# Patient Record
Sex: FEMALE | Race: BLACK OR AFRICAN AMERICAN | NOT HISPANIC OR LATINO | URBAN - METROPOLITAN AREA
[De-identification: names, ages, dates, MRNs, and addresses within clinical notes are randomized per-mention and may not be internally consistent; named-entity substitution may affect disease eponyms.]

---

## 2019-01-30 ENCOUNTER — EMERGENCY (EMERGENCY)
Facility: HOSPITAL | Age: 79
LOS: 1 days | Discharge: HOME | End: 2019-01-30
Attending: EMERGENCY MEDICINE | Admitting: EMERGENCY MEDICINE
Payer: COMMERCIAL

## 2019-01-30 VITALS
WEIGHT: 141.1 LBS | HEART RATE: 68 BPM | OXYGEN SATURATION: 97 % | DIASTOLIC BLOOD PRESSURE: 97 MMHG | SYSTOLIC BLOOD PRESSURE: 182 MMHG | RESPIRATION RATE: 17 BRPM | TEMPERATURE: 98 F

## 2019-01-30 PROCEDURE — 99283 EMERGENCY DEPT VISIT LOW MDM: CPT | Mod: 25

## 2019-01-30 PROCEDURE — 10060 I&D ABSCESS SIMPLE/SINGLE: CPT | Mod: 26

## 2019-01-30 PROCEDURE — 10060 I&D ABSCESS SIMPLE/SINGLE: CPT

## 2019-01-30 RX ORDER — CEPHALEXIN 500 MG
1 CAPSULE ORAL
Qty: 21 | Refills: 0 | OUTPATIENT
Start: 2019-01-30 | End: 2019-02-05

## 2019-01-30 RX ORDER — AZTREONAM 2 G
1 VIAL (EA) INJECTION
Qty: 20 | Refills: 0 | OUTPATIENT
Start: 2019-01-30 | End: 2019-02-08

## 2019-01-30 NOTE — ED ADULT NURSE REASSESSMENT NOTE - NS ED NURSE REASSESS COMMENT FT1
Patient evaluated by GYne and ED MD, vital signs stable, no new symptom complaint, discharged to [facility] in stable condition.

## 2019-01-30 NOTE — ED PROVIDER NOTE - OBJECTIVE STATEMENT
78 year old female presents to ED with concern for abscess formation to left perineum, progressive over the past 2 days.  Patient notes the area is very tender for her.  She denies history of similar symptoms in the past, fever, chills, chest pain, shortness of breath, abdominal pain, vaginal discharge or bleeding or any additional acute complaints or concerns at this time.

## 2019-01-30 NOTE — ED PROVIDER NOTE - MEDICAL DECISION MAKING DETAILS
pt in ED w concern for abscess to left perineum.  + Fluctuance.  No rectal involvement.  I&D/aspiration performed using 18 gauge needle and several cc's of purulent material drainaed.  Small amount of remaining induration to area.  Will prescribe keflex, bactrim and advise prompt PCP follow up as she already as planned for tomorrow with her physician.  Patient is advised to return to ED immediately should her symptoms worsen or if she has any concern prior to this recommended follow up.  Patient is aware of plan and verbalizes her understanding.  Will discharge home at this time.

## 2019-01-30 NOTE — ED ADULT NURSE NOTE - NSIMPLEMENTINTERV_GEN_ALL_ED
Implemented All Universal Safety Interventions:  [place] to call system. Call bell, personal items and telephone within reach. Instruct patient to call for assistance. Room bathroom lighting operational. Non-slip footwear when patient is off stretcher. Physically safe environment: no spills, clutter or unnecessary equipment. Stretcher in lowest position, wheels locked, appropriate side rails in place.

## 2019-01-30 NOTE — ED ADULT NURSE NOTE - OBJECTIVE STATEMENT
Patient c/o of pain on rectal area X 2 days, states may have an abscess in area.  No discharge or bleeding noted, no fevers.

## 2019-01-30 NOTE — ED PROVIDER NOTE - GENITOURINARY, MLM
+ Fluctuant abscess to left perineum measuring apx 2 cm x 2 cm.  Small amount of overlying erythema.

## 2019-02-03 DIAGNOSIS — L02.31 CUTANEOUS ABSCESS OF BUTTOCK: ICD-10-CM

## 2019-02-03 DIAGNOSIS — L02.215 CUTANEOUS ABSCESS OF PERINEUM: ICD-10-CM

## 2019-02-03 DIAGNOSIS — Z88.0 ALLERGY STATUS TO PENICILLIN: ICD-10-CM

## 2019-02-03 DIAGNOSIS — Z88.5 ALLERGY STATUS TO NARCOTIC AGENT: ICD-10-CM

## 2022-02-18 ENCOUNTER — EMERGENCY (EMERGENCY)
Facility: HOSPITAL | Age: 82
LOS: 1 days | Discharge: HOME | End: 2022-02-18
Attending: EMERGENCY MEDICINE | Admitting: EMERGENCY MEDICINE
Payer: COMMERCIAL

## 2022-02-18 VITALS
RESPIRATION RATE: 16 BRPM | WEIGHT: 149.91 LBS | TEMPERATURE: 98 F | DIASTOLIC BLOOD PRESSURE: 107 MMHG | OXYGEN SATURATION: 96 % | SYSTOLIC BLOOD PRESSURE: 194 MMHG | HEART RATE: 60 BPM

## 2022-02-18 VITALS
HEART RATE: 62 BPM | TEMPERATURE: 98 F | RESPIRATION RATE: 17 BRPM | OXYGEN SATURATION: 99 % | DIASTOLIC BLOOD PRESSURE: 82 MMHG | SYSTOLIC BLOOD PRESSURE: 148 MMHG

## 2022-02-18 DIAGNOSIS — M79.604 PAIN IN RIGHT LEG: ICD-10-CM

## 2022-02-18 DIAGNOSIS — M19.90 UNSPECIFIED OSTEOARTHRITIS, UNSPECIFIED SITE: ICD-10-CM

## 2022-02-18 DIAGNOSIS — G62.9 POLYNEUROPATHY, UNSPECIFIED: ICD-10-CM

## 2022-02-18 DIAGNOSIS — Z88.5 ALLERGY STATUS TO NARCOTIC AGENT: ICD-10-CM

## 2022-02-18 DIAGNOSIS — E11.42 TYPE 2 DIABETES MELLITUS WITH DIABETIC POLYNEUROPATHY: ICD-10-CM

## 2022-02-18 DIAGNOSIS — I10 ESSENTIAL (PRIMARY) HYPERTENSION: ICD-10-CM

## 2022-02-18 DIAGNOSIS — Z88.0 ALLERGY STATUS TO PENICILLIN: ICD-10-CM

## 2022-02-18 DIAGNOSIS — M54.12 RADICULOPATHY, CERVICAL REGION: ICD-10-CM

## 2022-02-18 LAB
ALBUMIN SERPL ELPH-MCNC: 4.3 G/DL — SIGNIFICANT CHANGE UP (ref 3.3–5)
ALP SERPL-CCNC: 70 U/L — SIGNIFICANT CHANGE UP (ref 40–120)
ALT FLD-CCNC: 12 U/L — SIGNIFICANT CHANGE UP (ref 10–45)
ANION GAP SERPL CALC-SCNC: 12 MMOL/L — SIGNIFICANT CHANGE UP (ref 5–17)
APPEARANCE UR: CLEAR — SIGNIFICANT CHANGE UP
AST SERPL-CCNC: 19 U/L — SIGNIFICANT CHANGE UP (ref 10–40)
BACTERIA # UR AUTO: SIGNIFICANT CHANGE UP /HPF
BASOPHILS # BLD AUTO: 0.07 K/UL — SIGNIFICANT CHANGE UP (ref 0–0.2)
BASOPHILS NFR BLD AUTO: 1.1 % — SIGNIFICANT CHANGE UP (ref 0–2)
BILIRUB SERPL-MCNC: 0.4 MG/DL — SIGNIFICANT CHANGE UP (ref 0.2–1.2)
BILIRUB UR-MCNC: NEGATIVE — SIGNIFICANT CHANGE UP
BUN SERPL-MCNC: 24 MG/DL — HIGH (ref 7–23)
CALCIUM SERPL-MCNC: 10.7 MG/DL — HIGH (ref 8.4–10.5)
CHLORIDE SERPL-SCNC: 94 MMOL/L — LOW (ref 96–108)
CO2 SERPL-SCNC: 31 MMOL/L — SIGNIFICANT CHANGE UP (ref 22–31)
COLOR SPEC: YELLOW — SIGNIFICANT CHANGE UP
CREAT SERPL-MCNC: 0.92 MG/DL — SIGNIFICANT CHANGE UP (ref 0.5–1.3)
DIFF PNL FLD: NEGATIVE — SIGNIFICANT CHANGE UP
EOSINOPHIL # BLD AUTO: 0.12 K/UL — SIGNIFICANT CHANGE UP (ref 0–0.5)
EOSINOPHIL NFR BLD AUTO: 1.9 % — SIGNIFICANT CHANGE UP (ref 0–6)
EPI CELLS # UR: SIGNIFICANT CHANGE UP /HPF (ref 0–5)
GLUCOSE SERPL-MCNC: 97 MG/DL — SIGNIFICANT CHANGE UP (ref 70–99)
GLUCOSE UR QL: NEGATIVE — SIGNIFICANT CHANGE UP
HCT VFR BLD CALC: 40.8 % — SIGNIFICANT CHANGE UP (ref 34.5–45)
HGB BLD-MCNC: 13.1 G/DL — SIGNIFICANT CHANGE UP (ref 11.5–15.5)
IMM GRANULOCYTES NFR BLD AUTO: 0.3 % — SIGNIFICANT CHANGE UP (ref 0–1.5)
KETONES UR-MCNC: NEGATIVE — SIGNIFICANT CHANGE UP
LEUKOCYTE ESTERASE UR-ACNC: NEGATIVE — SIGNIFICANT CHANGE UP
LYMPHOCYTES # BLD AUTO: 1.65 K/UL — SIGNIFICANT CHANGE UP (ref 1–3.3)
LYMPHOCYTES # BLD AUTO: 25.8 % — SIGNIFICANT CHANGE UP (ref 13–44)
MAGNESIUM SERPL-MCNC: 1.7 MG/DL — SIGNIFICANT CHANGE UP (ref 1.6–2.6)
MCHC RBC-ENTMCNC: 27 PG — SIGNIFICANT CHANGE UP (ref 27–34)
MCHC RBC-ENTMCNC: 32.1 GM/DL — SIGNIFICANT CHANGE UP (ref 32–36)
MCV RBC AUTO: 84 FL — SIGNIFICANT CHANGE UP (ref 80–100)
MONOCYTES # BLD AUTO: 0.65 K/UL — SIGNIFICANT CHANGE UP (ref 0–0.9)
MONOCYTES NFR BLD AUTO: 10.2 % — SIGNIFICANT CHANGE UP (ref 2–14)
NEUTROPHILS # BLD AUTO: 3.89 K/UL — SIGNIFICANT CHANGE UP (ref 1.8–7.4)
NEUTROPHILS NFR BLD AUTO: 60.7 % — SIGNIFICANT CHANGE UP (ref 43–77)
NITRITE UR-MCNC: NEGATIVE — SIGNIFICANT CHANGE UP
NRBC # BLD: 0 /100 WBCS — SIGNIFICANT CHANGE UP (ref 0–0)
PH UR: 6.5 — SIGNIFICANT CHANGE UP (ref 5–8)
PLATELET # BLD AUTO: 337 K/UL — SIGNIFICANT CHANGE UP (ref 150–400)
POTASSIUM SERPL-MCNC: 3 MMOL/L — LOW (ref 3.5–5.3)
POTASSIUM SERPL-SCNC: 3 MMOL/L — LOW (ref 3.5–5.3)
PROT SERPL-MCNC: 7.4 G/DL — SIGNIFICANT CHANGE UP (ref 6–8.3)
PROT UR-MCNC: ABNORMAL MG/DL
RBC # BLD: 4.86 M/UL — SIGNIFICANT CHANGE UP (ref 3.8–5.2)
RBC # FLD: 14.5 % — SIGNIFICANT CHANGE UP (ref 10.3–14.5)
RBC CASTS # UR COMP ASSIST: < 5 /HPF — SIGNIFICANT CHANGE UP
SODIUM SERPL-SCNC: 137 MMOL/L — SIGNIFICANT CHANGE UP (ref 135–145)
SP GR SPEC: 1.01 — SIGNIFICANT CHANGE UP (ref 1–1.03)
UROBILINOGEN FLD QL: 0.2 E.U./DL — SIGNIFICANT CHANGE UP
WBC # BLD: 6.4 K/UL — SIGNIFICANT CHANGE UP (ref 3.8–10.5)
WBC # FLD AUTO: 6.4 K/UL — SIGNIFICANT CHANGE UP (ref 3.8–10.5)
WBC UR QL: < 5 /HPF — SIGNIFICANT CHANGE UP

## 2022-02-18 PROCEDURE — 72125 CT NECK SPINE W/O DYE: CPT | Mod: MG

## 2022-02-18 PROCEDURE — 36415 COLL VENOUS BLD VENIPUNCTURE: CPT

## 2022-02-18 PROCEDURE — G1004: CPT

## 2022-02-18 PROCEDURE — 93971 EXTREMITY STUDY: CPT

## 2022-02-18 PROCEDURE — 83735 ASSAY OF MAGNESIUM: CPT

## 2022-02-18 PROCEDURE — 93971 EXTREMITY STUDY: CPT | Mod: 26,RT

## 2022-02-18 PROCEDURE — 72125 CT NECK SPINE W/O DYE: CPT | Mod: 26,MG

## 2022-02-18 PROCEDURE — 70450 CT HEAD/BRAIN W/O DYE: CPT | Mod: MG

## 2022-02-18 PROCEDURE — 81001 URINALYSIS AUTO W/SCOPE: CPT

## 2022-02-18 PROCEDURE — 99284 EMERGENCY DEPT VISIT MOD MDM: CPT | Mod: 25

## 2022-02-18 PROCEDURE — 80053 COMPREHEN METABOLIC PANEL: CPT

## 2022-02-18 PROCEDURE — 85025 COMPLETE CBC W/AUTO DIFF WBC: CPT

## 2022-02-18 PROCEDURE — 70450 CT HEAD/BRAIN W/O DYE: CPT | Mod: 26,MG

## 2022-02-18 PROCEDURE — 99285 EMERGENCY DEPT VISIT HI MDM: CPT

## 2022-02-18 RX ORDER — METHOCARBAMOL 500 MG/1
750 TABLET, FILM COATED ORAL ONCE
Refills: 0 | Status: COMPLETED | OUTPATIENT
Start: 2022-02-18 | End: 2022-02-18

## 2022-02-18 RX ORDER — POTASSIUM CHLORIDE 20 MEQ
40 PACKET (EA) ORAL ONCE
Refills: 0 | Status: DISCONTINUED | OUTPATIENT
Start: 2022-02-18 | End: 2022-02-18

## 2022-02-18 RX ORDER — METHOCARBAMOL 500 MG/1
1 TABLET, FILM COATED ORAL
Qty: 30 | Refills: 0
Start: 2022-02-18 | End: 2022-03-19

## 2022-02-18 RX ORDER — POTASSIUM CHLORIDE 20 MEQ
40 PACKET (EA) ORAL ONCE
Refills: 0 | Status: COMPLETED | OUTPATIENT
Start: 2022-02-18 | End: 2022-02-18

## 2022-02-18 RX ADMIN — METHOCARBAMOL 750 MILLIGRAM(S): 500 TABLET, FILM COATED ORAL at 21:29

## 2022-02-18 RX ADMIN — Medication 40 MILLIEQUIVALENT(S): at 21:29

## 2022-02-18 NOTE — ED PROVIDER NOTE - OBJECTIVE STATEMENT
81 yo female with h/o HTN, DM, arthritis, neuropathy in the ER c/o worsening of chronic right leg pain, right arm and shoulder region pain, c/o frequent numbness to her right leg and arm. pt state her doctor prescribed her Gabapentin and recommended to take double dose if pain and numbness worse but its not helping. Pt denies any fall or injury, denies HA, dizziness, vision changes, denies CP, abdominal pain or back pain. Ambulatory with cane at baseline because of her leg arthritis.

## 2022-02-18 NOTE — ED ADULT NURSE NOTE - OBJECTIVE STATEMENT
Pt is an 83 y/o F, Hx HTN, neuropathy. pt reports right leg pain, right arm numbness x 1 month, "the numbness is worse when I try to sleep on that side."  states she is compliant with meds, took  today .denies headache, dizziness, n/v, weakness, slurred speech, facial droop or other complaints.

## 2022-02-18 NOTE — ED PROVIDER NOTE - NSFOLLOWUPINSTRUCTIONS_ED_ALL_ED_FT
CERVICAL RADICULOPATHY - General Information           Cervical Radiculopathy    WHAT YOU NEED TO KNOW:    What is cervical radiculopathy? Cervical radiculopathy is a painful condition that happens when a spinal nerve in your neck is pinched or irritated.     Vertebral Column         What causes cervical radiculopathy? Changes in the vertebrae (bones) and discs in your neck can put pressure on a spinal nerve. Discs are natural, spongy cushions between your vertebrae that allow your spine to move. The following can cause a pinched nerve:  •Disc damage may occur if a disc flattens, bulges, or moves over time. An injury can also cause disc damage.      •Cervical spondylosis is when the vertebrae in your neck break down. This normally occurs as you age.       •Growths, such as tumors or cysts (fluid-filled lumps), may grow and press on the nerve.      What are the signs and symptoms of cervical radiculopathy? The most common symptom is sharp pain that travels from your neck all the way down your arm. You may have pain in your shoulder, chest, and hand. The pain may get worse with movement or when you cough or sneeze. You may also have any of the following:   •Burning or tingling sensations in your neck or arm       •Numbness or weakness in your arm or hand that makes it hard for you to  objects      •Headaches       How is cervical radiculopathy diagnosed? Your healthcare provider will ask when and how your symptoms began. He will gently press on your neck to check for tenderness and areas that are not shaped correctly. He will also check your arms and hands for numbness or weakness. You may have any of the following:   •Provocative tests are done to check your response to certain movements. He will ask you to move your neck, shoulder, and arm in different ways. Some movements will increase your symptoms, while others will make you feel better.      •An x-ray is a picture of the bones and tissues in your neck.      •An MRI or a CT scan may be used to take pictures of your neck. The pictures can show problems and changes in your nerves, discs, and vertebrae. You may be given dye to help the pictures show up better. Tell the healthcare provider if you have ever had an allergic reaction to contrast dye. Do not enter the MRI room with anything metal. Metal can cause serious injury. Tell the healthcare provider if you have any metal in or on your body.      •An electromyography is also called an EMG. An EMG is done to test the function of your muscles and the nerves that control them. Electrodes (wires) are placed on the muscle being tested. Needles may be attached to the electrodes and placed in your skin. The electrical activity of your muscles and nerves is measured by a machine attached to the electrodes. Your muscles are tested at rest and during movement.      How is cervical radiculopathy treated?   •NSAIDs decrease swelling and pain. This medicine can be bought without a doctor's order. This medicine can cause stomach bleeding or kidney problems in certain people. If you take blood thinner medicine, always ask your healthcare provider if NSAIDs are safe for you. Always read the medicine label and follow the directions on it before using this medicine.      •Prescription pain medicine may be given to decrease pain. Do not wait until the pain is severe before you take this medicine.      •Steroids help decrease pain and swelling. These may be given as a pill or as an injection in your neck. You may need more than 1 injection if your symptoms do not improve after the first treatment.       •Physical therapy helps stretch and strengthen your muscles. Your physical therapist can teach you how to improve your posture and the way you hold your neck. He may also teach you how to be safely active and avoid further injury. He can also help you develop an exercise program that is safe for your back and neck.       •Surgery may be used to treat a pinched nerve if other treatments have not helped after 6 to 12 weeks.       How can I manage my symptoms?   •Ice helps decrease swelling and pain. Ice may also help prevent tissue damage. Use an ice pack, or put crushed ice in a plastic bag. Cover it with a towel and place it on your neck for 15 to 20 minutes every hour or as directed.      •Rest when you feel it is needed. Slowly start to do more each day. Return to your daily activities as directed.       •Wear a soft collar. You may be given a soft collar to support your neck while you sleep. Wear the soft collar only as directed.   Cervical Collars           •Do light stretches and regular exercise. Your healthcare provider may suggest light stretches to help decrease stiffness in your neck and arm as you recover. After your pain is controlled, you may benefit from regular exercise. Ask what type of exercise is safe for your back and neck.       •Review your work area. A comfortable work area can help prevent neck strain. Ask your employer for an ergonomic review to check the position of your desk, chair, phone, and computer. Make any necessary adjustments for your comfort.      When should I contact my healthcare provider?   •You are losing weight without trying.      •Your pain is worse, even with medicine.      •One or both hands feel more numb than before, or you cannot move your fingers well.      •You have questions or concerns about your condition or care.      CARE AGREEMENT:    You have the right to help plan your care. Learn about your health condition and how it may be treated. Discuss treatment options with your healthcare providers to decide what care you want to receive. You always have the right to refuse treatment.                     DIABETIC PERIPHERAL NEUROPATHY - AfterCare(R) Instructions(ER/ED)           Diabetic Peripheral Neuropathy    WHAT YOU NEED TO KNOW:    Diabetic peripheral neuropathy (DPN) is damage to the nerves in your arms, hands, legs, and feet. DPN is most common in the legs and feet and can increase your risk for foot ulcers. Nerve pain caused by DPN can limit your mobility, and affect your quality of life.    DISCHARGE INSTRUCTIONS:    Return to the emergency department if:   •Your legs or feet start to turn blue or black.       •You have a wound that does not heal or is red, swollen, or draining fluid.       Call your care team provider if:   •You begin to have symptoms.       •Your blood sugar level is higher or lower than care team providers have told you it should be.       •You have redness, calluses, or sores on your feet.      •You have questions or concerns about your condition or care.      Medicines:   •Medicine may be given to decrease nerve pain.       •Take your medicine as directed. Contact your healthcare provider if you think your medicine is not helping or if you have side effects. Tell him or her if you are allergic to any medicine. Keep a list of the medicines, vitamins, and herbs you take. Include the amounts, and when and why you take them. Bring the list or the pill bottles to follow-up visits. Carry your medicine list with you in case of an emergency.      Control your blood sugar: Keep your blood sugar levels as close to normal as possible by taking your medicines as directed. Check your blood sugar levels as often as directed. Contact your healthcare provider if your levels are higher than they should be.    How to check your blood sugar     •Follow the meal plan that your care team provider or dietitian gave you. This meal plan can help you control your blood sugar and decrease your symptoms.      •Be physically active. Physical activity, such as exercise, can help keep your blood sugar level steady and help you manage your weight. Be active for at least 30 minutes, 5 days a week. Ask your care team provider about the best activity plan for you. Use caution when you exercise if you have decreased feeling in your feet.      •Maintain a healthy weight. Ask your care team provider how much you should weigh. A healthy weight can help you control your diabetes. Ask him to help you create a weight loss plan if you are overweight. Even a 10 to 15 pound weight loss can help you manage your blood sugar level.       •Know the risks if you choose to drink alcohol. Alcohol can make it harder to manage diabetes. Alcohol can cause your blood sugar levels to be low if you use insulin. Alcohol can cause high blood sugar levels and weight gain if you drink too much. Women 21 years or older and men 65 years or older should limit alcohol to 1 drink a day. Men aged 21 to 64 years should limit alcohol to 2 drinks a day. A drink of alcohol is 12 ounces of beer, 5 ounces of wine, or 1½ ounces of liquor.       Care for your feet: Check your feet each day for cuts, scratches, calluses, or other wounds. Look for redness and swelling, and feel for warmth. Wear shoes that fit well. Check your shoes for rocks or other objects that can hurt your feet. Do not walk barefoot or wear shoes without socks. Wear cotton socks to help keep your feet dry.    Do not smoke: Nicotine can worsen your symptoms and make it more difficult to manage your diabetes. Do not use e-cigarettes or smokeless tobacco in place of cigarettes or to help you quit. They still contain nicotine. Ask your care team provider for information if you currently smoke and need help quitting.    Follow up with your care team provider as directed: You will need to have your feet checked at least once each year. Write down your questions so you remember to ask them during your visits.

## 2022-02-18 NOTE — ED PROVIDER NOTE - MUSCULOSKELETAL, MLM
Spine and all extremities grossly appears normal, range of motion is not limited, no muscle or joint tenderness, generalized tenderness to right shoulder region and right cervical muscles

## 2022-02-18 NOTE — ED ADULT TRIAGE NOTE - CHIEF COMPLAINT QUOTE
Pt reports right leg pain, right arm numbness x 1 month, "the numbness is worse when I try to sleep on that side." Pt denies headache, dizziness, n/v, weakness. Pt has hx of htn, took meds today. Denies chest pain, sob.

## 2022-02-18 NOTE — ED PROVIDER NOTE - PATIENT PORTAL LINK FT
You can access the FollowMyHealth Patient Portal offered by [facility] by registering at the following website: [email or web address]. By joining [facility]’s FollowMyHealth portal, you will also be able to view your health information using other applications (apps) compatible with our system.

## 2022-02-18 NOTE — ED PROVIDER NOTE - CLINICAL SUMMARY MEDICAL DECISION MAKING FREE TEXT BOX
81 yo female with h/o HTN, DM, arthritis, neuropathy in the ER c/o worsening of chronic right leg pain, right arm and shoulder region pain, c/o frequent numbness to her right leg and arm. pt state her doctor prescribed her Gabapentin and recommended to take double dose if pain and numbness worse but its not helping. Pt denies any fall or injury, denies HA, dizziness, vision changes, denies CP, abdominal pain or back pain. Ambulatory with cane at baseline because of her leg arthritis.  pt well appearing, ambulatory, has no focal neurodeficits on exam. labs and imaging done. no acute pathology that would require further emergency testing/treatment.  most likely symptoms are due to chronic degenerative changes /radiculopathy and diabetic neuropathy. comfortable for discharge . physical therapy, muscle relaxant recommended and to f/u with neurologist. pt agrees with a plan

## 2025-04-28 NOTE — ED PROVIDER NOTE - NS ED MD EM SELECTION
One of our staff members will call you one business day before your surgery between 12-4pm to let you know the time to arrive at the hospital. If you have not received a phone call by 2pm call [phone].     When you arrive at the hospital, go to Registration on the ground floor.   You will need a responsible adult to drive you home.     Prior to surgery date:  One (1) week prior to surgery STOP:  -Stop aspirin products. Do not take NSAIDS/ Ibuprofen, Aleve, Motrin. Okay to take Tylenol or Acetaminophen. Do not take vitamins, supplements, herbals, diet pills.   -Stop these medications:   -No alcohol for 24 hours prior to surgery.  -No smoking 24 hours prior. No Marijuana, CBD oil, or Vaping 48 hours prior to surgery.  -Enjoy a light supper the evening before surgery.    Day of Surgery:  -Nothing to eat or drink after midnight. This includes food of any kind (including hard candy, cough drops, mints and gum, coffee).   -Have 13oz of Clear liquids 2hrs prior to Arrival time.   -No acrylic nails or nail polish on at least one fingernail; no polish on toes for foot surgery.   -No body piercing or jewelry.   -Shower as directed. No deodorant, lotion, power, oils, perfume, make-up.  -Mouthwash night before and morning of surgery.   -Wear loose comfortable clothing to accommodate your bandages.     -Bring urine specimen if directed    -If  you have questions for PAT please call [phone].      Medication List            Accurate as of April 28, 2025  2:30 PM. Always use your most recent med list.                Ajovy Autoinjector 225 mg/1.5 mL auto-injector  Generic drug: fremanezumab  Inject 1 Pen (225 mg) under the skin every 28 (twenty-eight) days.  Medication Adjustments for Surgery: Do Not take on the morning of surgery     amitriptyline 10 mg tablet  Commonly known as: Elavil  Take 4 tablets (40 mg) by mouth once daily at bedtime.  Medication Adjustments for Surgery: Take/Use as prescribed     atorvastatin 80  mg tablet  Commonly known as: Lipitor  Medication Adjustments for Surgery: Take/Use as prescribed     cyclobenzaprine 5 mg tablet  Commonly known as: Flexeril  Take 1-2 tablets (5-10 mg) by mouth every 8 hours if needed for muscle spasms.  Medication Adjustments for Surgery: Do Not take on the morning of surgery     diazePAM 5 mg tablet  Commonly known as: Valium  Medication Adjustments for Surgery: Take/Use as prescribed     EPINEPHrine 0.3 mg/0.3 mL injection syringe  Commonly known as: Epipen  Inject 0.3 mL (0.3 mg) into the muscle if needed for anaphylaxis for up to 1 day. Call 911 after use.  Medication Adjustments for Surgery: Take/Use as prescribed     famotidine 40 mg tablet  Commonly known as: Pepcid  Take 1 tablet (40 mg) by mouth once daily.  Medication Adjustments for Surgery: Take/Use as prescribed     fluocinonide 0.05 % external solution  Commonly known as: Lidex  Use once a day to the affected area on the scalp. Use as needed. Avoid face and groin.     gabapentin 100 mg capsule  Commonly known as: Neurontin  Take 1 capsule (100 mg) by mouth 3 times a day.  Medication Adjustments for Surgery: Take/Use as prescribed     Gemtesa 75 mg tablet  Generic drug: vibegron  Medication Adjustments for Surgery: Do Not take on the morning of surgery     hydrocortisone 2.5 % rectal cream  Commonly known as: Anusol-HC  Insert into the rectum 2 times a day for 10 days.  Medication Adjustments for Surgery: Do Not take on the morning of surgery     hydrOXYzine HCL 25 mg tablet  Commonly known as: Atarax  Take 1 tablet (25 mg) by mouth once daily at bedtime.  Medication Adjustments for Surgery: Do Not take on the morning of surgery     ketoconazole 2 % shampoo  Commonly known as: NIZOral  Use 2-3x times a week. Leave on for 5 minutes before rinsing off.  Medication Adjustments for Surgery: Take/Use as prescribed     linaCLOtide 290 mcg capsule  Commonly known as: Linzess  Take 1 capsule (290 mcg) by mouth once daily in  the morning. Take before meals. Do not crush or chew.  Medication Adjustments for Surgery: Do Not take on the morning of surgery     magnesium aspart,citrate,oxide 400 mg magnesium capsule  Medication Adjustments for Surgery: Do Not take on the morning of surgery     methen-m.blue-s.phos-phsal-hyo 118-10-40.8-36 mg capsule  Medication Adjustments for Surgery: Do Not take on the morning of surgery     omeprazole 40 mg DR capsule  Commonly known as: PriLOSEC  TAKE 1 CAPSULE BY MOUTH ONCE DAILY IN THE MORNING BEFORE MEAL(S)  Medication Adjustments for Surgery: Take/Use as prescribed     ondansetron ODT 4 mg disintegrating tablet  Commonly known as: Zofran-ODT  Take 1 tablet (4 mg) by mouth every 6 hours if needed for nausea.  Medication Adjustments for Surgery: Take/Use as prescribed     polyethylene glycol-electrolytes 420 gram solution  Commonly known as: Nulytely Lemon-Lime  Take 4,000 mL by mouth see administration instructions. Take 2000 ml the night before and 2000 ml the morning of your colonoscopy     propranolol 40 mg tablet  Commonly known as: Inderal  Take 1 tablet by mouth twice daily  Medication Adjustments for Surgery: Take/Use as prescribed     Slynd 4 mg (28) tablet  Generic drug: drospirenone (contraceptive)  Take 1 tablet by mouth once daily.  Medication Adjustments for Surgery: Take/Use as prescribed     tamsulosin 0.4 mg 24 hr capsule  Commonly known as: Flomax  Take 2 capsules (0.8 mg) by mouth once daily at bedtime.  Medication Adjustments for Surgery: Take/Use as prescribed     traZODone 50 mg tablet  Commonly known as: Desyrel  Medication Adjustments for Surgery: Take/Use as prescribed     triamcinolone 0.1 % cream  Commonly known as: Kenalog  Apply topically 2 times a day. Apply to affected area 1-2 times daily as needed. Avoid face and groin.     ubrogepant 100 mg tablet  Commonly known as: Ubrelvy  Take 1 tablet (100 mg) by mouth if needed (Take one tablet at onset of migraine. If still  present 2 hrs later, can take a second tablet.).  Medication Adjustments for Surgery: Do Not take on the morning of surgery             [id number] [facility]